# Patient Record
Sex: MALE | Race: BLACK OR AFRICAN AMERICAN | NOT HISPANIC OR LATINO | URBAN - METROPOLITAN AREA
[De-identification: names, ages, dates, MRNs, and addresses within clinical notes are randomized per-mention and may not be internally consistent; named-entity substitution may affect disease eponyms.]

---

## 2025-05-29 ENCOUNTER — OFFICE VISIT (OUTPATIENT)
Dept: URGENT CARE | Age: 55
End: 2025-05-29
Payer: COMMERCIAL

## 2025-05-29 VITALS
SYSTOLIC BLOOD PRESSURE: 149 MMHG | DIASTOLIC BLOOD PRESSURE: 79 MMHG | WEIGHT: 198 LBS | RESPIRATION RATE: 18 BRPM | HEART RATE: 69 BPM | OXYGEN SATURATION: 98 %

## 2025-05-29 DIAGNOSIS — M10.9 GOUTY ARTHRITIS OF RIGHT GREAT TOE: Primary | ICD-10-CM

## 2025-05-29 RX ORDER — INDOMETHACIN 50 MG/1
50 CAPSULE ORAL 3 TIMES DAILY
Qty: 15 CAPSULE | Refills: 0 | Status: SHIPPED | OUTPATIENT
Start: 2025-05-29 | End: 2025-06-03

## 2025-05-29 NOTE — PROGRESS NOTES
Subjective   Patient ID: Shayan Lopez is a 54 y.o. male. They present today with a chief complaint of Foot Pain (Day 1 - Right Foot Pain / swelling ).    History of Present Illness  HPI  This a 54-year-old -American male presents today complaining of pain over the right great toe onset this morning.  He denies any trauma.  States the area appeared to be swollen.  Past Medical History  Allergies as of 05/29/2025    (No Known Allergies)       Prescriptions Prior to Admission[1]     Medical History[2]    Surgical History[3]     reports that he has never smoked. He has never used smokeless tobacco. He reports that he does not currently use alcohol.    Review of Systems  Review of Systems   Musculoskeletal:  Positive for gait problem.   All other systems reviewed and are negative.                                 Objective    Vitals:    05/29/25 1310   BP: 149/79   Pulse: 69   Resp: 18   SpO2: 98%   Weight: 89.8 kg (198 lb)     No LMP for male patient.    Physical Exam  Patient is awake alert oriented x 3 no acute distress vital signs are stable.  He is afebrile.  Ambulates with a limp.    Examination of the right foot reveals some mild erythema and diffuse tenderness over the metatarsal phalangeal joint of the right great toe.  Pain is worse with flexion extension of the toe.  Bony alignment is intact.  No ankle pain.  There was some mild soft tissue swelling noted over the dorsal aspect of the foot.  Distal pulses were intact.  Procedures    Point of Care Test & Imaging Results from this visit  No results found for this visit on 05/29/25.   Imaging  No results found.    Cardiology, Vascular, and Other Imaging  No other imaging results found for the past 2 days      Diagnostic study results (if any) were reviewed by John Watkins DO.    Assessment/Plan   Allergies, medications, history, and pertinent labs/EKGs/Imaging reviewed by John Watkins DO.     Medical Decision Making      Orders and  Diagnoses  Diagnoses and all orders for this visit:  Gouty arthritis of right great toe  -     indomethacin (Indocin) 50 mg capsule; Take 1 capsule (50 mg) by mouth 3 times a day for 5 days.      Medical Admin Record      Patient disposition: Home    Electronically signed by John Watkins DO  1:24 PM           [1] (Not in a hospital admission)  [2] History reviewed. No pertinent past medical history.  [3] History reviewed. No pertinent surgical history.

## 2025-05-29 NOTE — LETTER
May 29, 2025     Patient: Shayan Lopez   YOB: 1970   Date of Visit: 5/29/2025       To Whom It May Concern:    Shayan Lopez was seen in my clinic on 5/29/2025 at 12:50 pm. Please excuse Shayan for his absence from work on this day to make the appointment. Lloyd may return to work 6/2/2025.     If you have any questions or concerns, please don't hesitate to call.         Sincerely,         John Watkins,         CC: No Recipients

## 2025-06-04 ENCOUNTER — OFFICE VISIT (OUTPATIENT)
Dept: URGENT CARE | Age: 55
End: 2025-06-04
Payer: COMMERCIAL

## 2025-06-04 VITALS
DIASTOLIC BLOOD PRESSURE: 77 MMHG | RESPIRATION RATE: 19 BRPM | SYSTOLIC BLOOD PRESSURE: 138 MMHG | HEIGHT: 67 IN | BODY MASS INDEX: 31.08 KG/M2 | WEIGHT: 198 LBS | HEART RATE: 64 BPM | OXYGEN SATURATION: 97 % | TEMPERATURE: 98.2 F

## 2025-06-04 DIAGNOSIS — M10.9 GOUTY ARTHRITIS OF RIGHT GREAT TOE: Primary | ICD-10-CM

## 2025-06-04 RX ORDER — PREDNISONE 20 MG/1
TABLET ORAL
Qty: 20 TABLET | Refills: 0 | Status: SHIPPED | OUTPATIENT
Start: 2025-06-04 | End: 2025-06-19

## 2025-06-04 RX ORDER — PREDNISONE 20 MG/1
TABLET ORAL
Qty: 20 TABLET | Refills: 0 | Status: SHIPPED | OUTPATIENT
Start: 2025-06-04 | End: 2025-06-04 | Stop reason: SDUPTHER

## 2025-06-04 ASSESSMENT — ENCOUNTER SYMPTOMS
RESPIRATORY NEGATIVE: 1
GASTROINTESTINAL NEGATIVE: 1
NEUROLOGICAL NEGATIVE: 1
HEMATOLOGIC/LYMPHATIC NEGATIVE: 1
PSYCHIATRIC NEGATIVE: 1
CONSTITUTIONAL NEGATIVE: 1
CARDIOVASCULAR NEGATIVE: 1
JOINT SWELLING: 1
ENDOCRINE NEGATIVE: 1
EYES NEGATIVE: 1
ALLERGIC/IMMUNOLOGIC NEGATIVE: 1

## 2025-06-04 NOTE — PROGRESS NOTES
"Subjective   Patient ID: Shayan Lopez is a 54 y.o. male. They present today with a chief complaint of Foot Pain (Rt big toe pain ).    History of Present Illness    History provided by:  Patient   used: No    Other  Location:  Right great toe pain for 2 weeks, was started on indomethacin, not effective  Severity:  Severe  Onset quality:  Gradual  Duration:  2 weeks  Timing:  Constant  Chronicity:  New      Past Medical History  Allergies as of 06/04/2025    (No Known Allergies)       Prescriptions Prior to Admission[1]     Medical History[2]    Surgical History[3]     reports that he has never smoked. He has never used smokeless tobacco. He reports that he does not currently use alcohol.    Review of Systems  Review of Systems   Constitutional: Negative.    HENT: Negative.     Eyes: Negative.    Respiratory: Negative.     Cardiovascular: Negative.    Gastrointestinal: Negative.    Endocrine: Negative.    Genitourinary: Negative.    Musculoskeletal:  Positive for joint swelling.   Skin: Negative.    Allergic/Immunologic: Negative.    Neurological: Negative.    Hematological: Negative.    Psychiatric/Behavioral: Negative.     All other systems reviewed and are negative.                                 Objective    Vitals:    06/04/25 1010   BP: 138/77   Pulse: 64   Resp: 19   Temp: 36.8 °C (98.2 °F)   TempSrc: Oral   SpO2: 97%   Weight: 89.8 kg (198 lb)   Height: 1.702 m (5' 7\")     No LMP for male patient.    Physical Exam  Vitals and nursing note reviewed.   Constitutional:       Appearance: Normal appearance. He is normal weight.   Cardiovascular:      Rate and Rhythm: Normal rate and regular rhythm.      Pulses: Normal pulses.      Heart sounds: Normal heart sounds.   Pulmonary:      Effort: Pulmonary effort is normal.      Breath sounds: Normal breath sounds.   Abdominal:      General: Bowel sounds are normal.      Palpations: Abdomen is soft.   Musculoskeletal:      Cervical back: Normal " range of motion.        Feet:    Feet:      Right foot:      Skin integrity: Erythema and warmth present.   Neurological:      General: No focal deficit present.      Mental Status: He is alert and oriented to person, place, and time. Mental status is at baseline.   Psychiatric:         Mood and Affect: Mood normal.         Behavior: Behavior normal.         Thought Content: Thought content normal.         Judgment: Judgment normal.         Procedures    Point of Care Test & Imaging Results from this visit  No results found for this visit on 06/04/25.   Imaging  No results found.    Cardiology, Vascular, and Other Imaging  No other imaging results found for the past 2 days      Diagnostic study results (if any) were reviewed by NELI Reveles.    Assessment/Plan   Allergies, medications, history, and pertinent labs/EKGs/Imaging reviewed by NELI Reveles.     Medical Decision Making  Medical Decision Making  At time of discharge patient was clinically well-appearing and HDS for outpatient management. The patient and/or family was educated regarding diagnosis, supportive care, OTC and Rx medications. The patient and/or family was given the opportunity to ask questions prior to discharge.  They verbalized understanding of my discussion of the plans for treatment, expected course, indications to return to  or seek further evaluation in ED, and the need for timely follow up as directed.   They were provided with a work/school excuse if requested.        Orders and Diagnoses  Diagnoses and all orders for this visit:  Gouty arthritis of right great toe  -     Referral to Primary Care; Future  -     predniSONE (Deltasone) 20 mg tablet; Take 1 tablet (20 mg) by mouth 3 times a day for 5 days, THEN 1 tablet (20 mg) 2 times a day for 5 days, THEN 1 tablet (20 mg) once daily for 5 days.  -     predniSONE (Deltasone) 20 mg tablet; Take 1 tablet (20 mg) by mouth 3 times a day for 5 days, THEN 1 tablet  (20 mg) 2 times a day for 5 days, THEN 1 tablet (20 mg) once daily for 5 days.      Return to Urgent care if symptoms return or progress  Follow up with PCP in 1-2 weeks     Patient disposition: Home    Electronically signed by NELI Reveles  10:35 AM           [1] (Not in a hospital admission)  [2] No past medical history on file.  [3] No past surgical history on file.

## 2025-06-15 ENCOUNTER — CLINICAL SUPPORT (OUTPATIENT)
Dept: EMERGENCY MEDICINE | Facility: HOSPITAL | Age: 55
End: 2025-06-15
Payer: COMMERCIAL

## 2025-06-15 ENCOUNTER — APPOINTMENT (OUTPATIENT)
Dept: RADIOLOGY | Facility: HOSPITAL | Age: 55
End: 2025-06-15
Payer: COMMERCIAL

## 2025-06-15 ENCOUNTER — HOSPITAL ENCOUNTER (EMERGENCY)
Facility: HOSPITAL | Age: 55
Discharge: HOME | End: 2025-06-15
Attending: EMERGENCY MEDICINE
Payer: COMMERCIAL

## 2025-06-15 VITALS
HEART RATE: 78 BPM | RESPIRATION RATE: 16 BRPM | DIASTOLIC BLOOD PRESSURE: 78 MMHG | OXYGEN SATURATION: 98 % | SYSTOLIC BLOOD PRESSURE: 138 MMHG | TEMPERATURE: 97.2 F

## 2025-06-15 DIAGNOSIS — R07.9 CHEST PAIN, UNSPECIFIED TYPE: Primary | ICD-10-CM

## 2025-06-15 DIAGNOSIS — R41.82 ALTERED MENTAL STATUS, UNSPECIFIED ALTERED MENTAL STATUS TYPE: ICD-10-CM

## 2025-06-15 LAB
ALBUMIN SERPL BCP-MCNC: 4.7 G/DL (ref 3.4–5)
ALP SERPL-CCNC: 56 U/L (ref 33–120)
ALT SERPL W P-5'-P-CCNC: 9 U/L (ref 10–52)
AMPHETAMINES UR QL SCN: NORMAL
ANION GAP BLDV CALCULATED.4IONS-SCNC: 12 MMOL/L (ref 10–25)
ANION GAP SERPL CALC-SCNC: 17 MMOL/L (ref 10–20)
APAP SERPL-MCNC: <10 UG/ML (ref ?–30)
APPEARANCE UR: CLEAR
AST SERPL W P-5'-P-CCNC: 15 U/L (ref 9–39)
ATRIAL RATE: 108 BPM
BARBITURATES UR QL SCN: NORMAL
BASE EXCESS BLDV CALC-SCNC: 1.7 MMOL/L (ref -2–3)
BASOPHILS # BLD AUTO: 0.03 X10*3/UL (ref 0–0.1)
BASOPHILS NFR BLD AUTO: 0.4 %
BENZODIAZ UR QL SCN: NORMAL
BILIRUB SERPL-MCNC: 0.4 MG/DL (ref 0–1.2)
BILIRUB UR STRIP.AUTO-MCNC: NEGATIVE MG/DL
BNP SERPL-MCNC: <2 PG/ML (ref 0–99)
BODY TEMPERATURE: 37 DEGREES CELSIUS
BUN SERPL-MCNC: 13 MG/DL (ref 6–23)
BZE UR QL SCN: NORMAL
CA-I BLDV-SCNC: 1.15 MMOL/L (ref 1.1–1.33)
CALCIUM SERPL-MCNC: 9.8 MG/DL (ref 8.6–10.6)
CANNABINOIDS UR QL SCN: NORMAL
CARDIAC TROPONIN I PNL SERPL HS: 3 NG/L (ref 0–53)
CARDIAC TROPONIN I PNL SERPL HS: 4 NG/L (ref 0–53)
CHLORIDE BLDV-SCNC: 104 MMOL/L (ref 98–107)
CHLORIDE SERPL-SCNC: 101 MMOL/L (ref 98–107)
CO2 SERPL-SCNC: 21 MMOL/L (ref 21–32)
COLOR UR: COLORLESS
CREAT SERPL-MCNC: 1.16 MG/DL (ref 0.5–1.3)
EGFRCR SERPLBLD CKD-EPI 2021: 74 ML/MIN/1.73M*2
EOSINOPHIL # BLD AUTO: 0.07 X10*3/UL (ref 0–0.7)
EOSINOPHIL NFR BLD AUTO: 0.9 %
ERYTHROCYTE [DISTWIDTH] IN BLOOD BY AUTOMATED COUNT: 13.7 % (ref 11.5–14.5)
ETHANOL SERPL-MCNC: 147 MG/DL
FENTANYL+NORFENTANYL UR QL SCN: NORMAL
GLUCOSE BLD MANUAL STRIP-MCNC: 95 MG/DL (ref 74–99)
GLUCOSE BLDV-MCNC: 96 MG/DL (ref 74–99)
GLUCOSE SERPL-MCNC: 78 MG/DL (ref 74–99)
GLUCOSE UR STRIP.AUTO-MCNC: NORMAL MG/DL
HCO3 BLDV-SCNC: 26.6 MMOL/L (ref 22–26)
HCT VFR BLD AUTO: 47 % (ref 41–52)
HCT VFR BLD EST: 44 % (ref 41–52)
HGB BLD-MCNC: 15.3 G/DL (ref 13.5–17.5)
HGB BLDV-MCNC: 14.6 G/DL (ref 13.5–17.5)
HOLD SPECIMEN: NORMAL
IMM GRANULOCYTES # BLD AUTO: 0.03 X10*3/UL (ref 0–0.7)
IMM GRANULOCYTES NFR BLD AUTO: 0.4 % (ref 0–0.9)
KETONES UR STRIP.AUTO-MCNC: NEGATIVE MG/DL
LACTATE BLDV-SCNC: 2.1 MMOL/L (ref 0.4–2)
LACTATE BLDV-SCNC: 2.6 MMOL/L (ref 0.4–2)
LEUKOCYTE ESTERASE UR QL STRIP.AUTO: NEGATIVE
LIPASE SERPL-CCNC: 110 U/L (ref 9–82)
LYMPHOCYTES # BLD AUTO: 3.45 X10*3/UL (ref 1.2–4.8)
LYMPHOCYTES NFR BLD AUTO: 42.8 %
MCH RBC QN AUTO: 26.8 PG (ref 26–34)
MCHC RBC AUTO-ENTMCNC: 32.6 G/DL (ref 32–36)
MCV RBC AUTO: 83 FL (ref 80–100)
METHADONE UR QL SCN: NORMAL
MONOCYTES # BLD AUTO: 0.5 X10*3/UL (ref 0.1–1)
MONOCYTES NFR BLD AUTO: 6.2 %
NEUTROPHILS # BLD AUTO: 3.99 X10*3/UL (ref 1.2–7.7)
NEUTROPHILS NFR BLD AUTO: 49.3 %
NITRITE UR QL STRIP.AUTO: NEGATIVE
NRBC BLD-RTO: 0 /100 WBCS (ref 0–0)
OPIATES UR QL SCN: NORMAL
OXYCODONE+OXYMORPHONE UR QL SCN: NORMAL
OXYHGB MFR BLDV: 69.9 % (ref 45–75)
P AXIS: 62 DEGREES
P OFFSET: 215 MS
P ONSET: 161 MS
PCO2 BLDV: 42 MM HG (ref 41–51)
PCP UR QL SCN: NORMAL
PH BLDV: 7.41 PH (ref 7.33–7.43)
PH UR STRIP.AUTO: 6 [PH]
PLATELET # BLD AUTO: 311 X10*3/UL (ref 150–450)
PO2 BLDV: 45 MM HG (ref 35–45)
POTASSIUM BLDV-SCNC: 5 MMOL/L (ref 3.5–5.3)
POTASSIUM SERPL-SCNC: 3.9 MMOL/L (ref 3.5–5.3)
PR INTERVAL: 136 MS
PROT SERPL-MCNC: 8.6 G/DL (ref 6.4–8.2)
PROT UR STRIP.AUTO-MCNC: NEGATIVE MG/DL
Q ONSET: 229 MS
QRS COUNT: 18 BEATS
QRS DURATION: 78 MS
QT INTERVAL: 352 MS
QTC CALCULATION(BAZETT): 471 MS
QTC FREDERICIA: 428 MS
R AXIS: -19 DEGREES
RBC # BLD AUTO: 5.7 X10*6/UL (ref 4.5–5.9)
RBC # UR STRIP.AUTO: NEGATIVE MG/DL
SALICYLATES SERPL-MCNC: <3 MG/DL (ref ?–20)
SAO2 % BLDV: 71 % (ref 45–75)
SODIUM BLDV-SCNC: 138 MMOL/L (ref 136–145)
SODIUM SERPL-SCNC: 135 MMOL/L (ref 136–145)
SP GR UR STRIP.AUTO: 1.01
T AXIS: 37 DEGREES
T OFFSET: 405 MS
UROBILINOGEN UR STRIP.AUTO-MCNC: NORMAL MG/DL
VENTRICULAR RATE: 108 BPM
WBC # BLD AUTO: 8.1 X10*3/UL (ref 4.4–11.3)

## 2025-06-15 PROCEDURE — 93010 ELECTROCARDIOGRAM REPORT: CPT | Performed by: EMERGENCY MEDICINE

## 2025-06-15 PROCEDURE — 36415 COLL VENOUS BLD VENIPUNCTURE: CPT | Performed by: EMERGENCY MEDICINE

## 2025-06-15 PROCEDURE — 84484 ASSAY OF TROPONIN QUANT: CPT | Performed by: EMERGENCY MEDICINE

## 2025-06-15 PROCEDURE — 99285 EMERGENCY DEPT VISIT HI MDM: CPT | Mod: 25 | Performed by: EMERGENCY MEDICINE

## 2025-06-15 PROCEDURE — 82947 ASSAY GLUCOSE BLOOD QUANT: CPT

## 2025-06-15 PROCEDURE — 36415 COLL VENOUS BLD VENIPUNCTURE: CPT

## 2025-06-15 PROCEDURE — 71275 CT ANGIOGRAPHY CHEST: CPT

## 2025-06-15 PROCEDURE — 83690 ASSAY OF LIPASE: CPT | Performed by: EMERGENCY MEDICINE

## 2025-06-15 PROCEDURE — 93005 ELECTROCARDIOGRAM TRACING: CPT

## 2025-06-15 PROCEDURE — 71045 X-RAY EXAM CHEST 1 VIEW: CPT

## 2025-06-15 PROCEDURE — 83880 ASSAY OF NATRIURETIC PEPTIDE: CPT | Performed by: EMERGENCY MEDICINE

## 2025-06-15 PROCEDURE — 84075 ASSAY ALKALINE PHOSPHATASE: CPT | Performed by: EMERGENCY MEDICINE

## 2025-06-15 PROCEDURE — 80320 DRUG SCREEN QUANTALCOHOLS: CPT | Performed by: EMERGENCY MEDICINE

## 2025-06-15 PROCEDURE — 85025 COMPLETE CBC W/AUTO DIFF WBC: CPT | Performed by: EMERGENCY MEDICINE

## 2025-06-15 PROCEDURE — 2550000001 HC RX 255 CONTRASTS: Performed by: EMERGENCY MEDICINE

## 2025-06-15 PROCEDURE — 81003 URINALYSIS AUTO W/O SCOPE: CPT | Performed by: EMERGENCY MEDICINE

## 2025-06-15 PROCEDURE — 71275 CT ANGIOGRAPHY CHEST: CPT | Performed by: RADIOLOGY

## 2025-06-15 PROCEDURE — 99291 CRITICAL CARE FIRST HOUR: CPT | Performed by: EMERGENCY MEDICINE

## 2025-06-15 PROCEDURE — 74174 CTA ABD&PLVS W/CONTRAST: CPT | Performed by: RADIOLOGY

## 2025-06-15 PROCEDURE — 82805 BLOOD GASES W/O2 SATURATION: CPT

## 2025-06-15 PROCEDURE — 70450 CT HEAD/BRAIN W/O DYE: CPT | Performed by: RADIOLOGY

## 2025-06-15 PROCEDURE — 80053 COMPREHEN METABOLIC PANEL: CPT

## 2025-06-15 PROCEDURE — 80307 DRUG TEST PRSMV CHEM ANLYZR: CPT | Performed by: EMERGENCY MEDICINE

## 2025-06-15 PROCEDURE — 70450 CT HEAD/BRAIN W/O DYE: CPT

## 2025-06-15 PROCEDURE — 71045 X-RAY EXAM CHEST 1 VIEW: CPT | Performed by: RADIOLOGY

## 2025-06-15 PROCEDURE — 2500000001 HC RX 250 WO HCPCS SELF ADMINISTERED DRUGS (ALT 637 FOR MEDICARE OP)

## 2025-06-15 PROCEDURE — 83605 ASSAY OF LACTIC ACID: CPT

## 2025-06-15 RX ORDER — NALOXONE HYDROCHLORIDE 1 MG/ML
INJECTION INTRAMUSCULAR; INTRAVENOUS; SUBCUTANEOUS
Status: DISCONTINUED
Start: 2025-06-15 | End: 2025-06-15 | Stop reason: HOSPADM

## 2025-06-15 RX ORDER — NALOXONE HYDROCHLORIDE 1 MG/ML
2 INJECTION INTRAMUSCULAR; INTRAVENOUS; SUBCUTANEOUS ONCE
Status: DISCONTINUED | OUTPATIENT
Start: 2025-06-15 | End: 2025-06-15 | Stop reason: HOSPADM

## 2025-06-15 RX ORDER — NALOXONE HYDROCHLORIDE 1 MG/ML
4 INJECTION INTRAMUSCULAR; INTRAVENOUS; SUBCUTANEOUS ONCE
Status: DISCONTINUED | OUTPATIENT
Start: 2025-06-15 | End: 2025-06-15 | Stop reason: HOSPADM

## 2025-06-15 RX ORDER — NALOXONE HYDROCHLORIDE 0.4 MG/ML
0.2 INJECTION, SOLUTION INTRAMUSCULAR; INTRAVENOUS; SUBCUTANEOUS EVERY 5 MIN PRN
Status: CANCELLED | OUTPATIENT
Start: 2025-06-15

## 2025-06-15 RX ORDER — ACETAMINOPHEN 325 MG/1
975 TABLET ORAL ONCE
Status: COMPLETED | OUTPATIENT
Start: 2025-06-15 | End: 2025-06-15

## 2025-06-15 RX ADMIN — ACETAMINOPHEN 975 MG: 325 TABLET ORAL at 13:35

## 2025-06-15 RX ADMIN — IOHEXOL 90 ML: 350 INJECTION, SOLUTION INTRAVENOUS at 05:09

## 2025-06-15 ASSESSMENT — LIFESTYLE VARIABLES
HAVE PEOPLE ANNOYED YOU BY CRITICIZING YOUR DRINKING: NO
HAVE YOU EVER FELT YOU SHOULD CUT DOWN ON YOUR DRINKING: NO
EVER FELT BAD OR GUILTY ABOUT YOUR DRINKING: NO
TOTAL SCORE: 0
EVER HAD A DRINK FIRST THING IN THE MORNING TO STEADY YOUR NERVES TO GET RID OF A HANGOVER: NO

## 2025-06-15 ASSESSMENT — PAIN SCALES - GENERAL
PAINLEVEL_OUTOF10: 3
PAINLEVEL_OUTOF10: 3

## 2025-06-15 ASSESSMENT — PAIN - FUNCTIONAL ASSESSMENT: PAIN_FUNCTIONAL_ASSESSMENT: 0-10

## 2025-06-15 NOTE — ED NOTES
Pt moved from room 3 to room 27, No report given from Trauma RN. Per Provider, Pt had near syncopal episode in triage, Critical activated and Pt taken to Room 3, 8mg Narcan given, Per provider, Pt appears intoxicated, will reassess when sober. Pt VSS, Resp E&U at this time.      Stephen Weston RN  06/15/25 0975

## 2025-06-15 NOTE — ED PROVIDER NOTES
Note created in error. Author (Enrico Mccann, MS4) signed up for this patient prior to completion of triage. Found to be a critical patient. Pavel Pickett and Angie to assume care for this patient.      Enrico Mccann  06/15/25 0436

## 2025-06-15 NOTE — DISCHARGE INSTRUCTIONS
May take Tylenol or ibuprofen if able for continued discomfort.  Please follow-up with your primary doctor as well as cardiology.  I have ordered a referral to cardiology.  I have also provided their contact information here.  Return to the emergency department for new or worsening symptoms or for any other complaints.    --   [facility]  Phone: [phone]  Address: [place] [address]    --   Cardiology Clinic  [facility] [place]  Phone: [phone]

## 2025-06-15 NOTE — PROGRESS NOTES
Emergency Department Transition of Care Note       Signout   I received Shayan Lopez in signout from Dr. Pickett.  Please see the previous note for all HPI, PE and MDM up to the time of signout at 0700.    In brief Shayan Lopez is an 54 y.o. male presenting for   Chief Complaint   Patient presents with    Chest Pain           ED Course & Medical Decision Making   At the time of signout, the patient's disposition is pending sober reevaluation.  This is a 54-year-old male who presented to the emergency department initially with chest pain.  Apparently had a near syncope or syncopal event in triage and was activated as a critical patient.  Had a complete chest pain workup that was largely unremarkable.  Opponent is normal.  No EKG changes.  CT PE was obtained that was negative.  CT head was without intracranial pathology.  Alcohol was elevated.  Please see the ED course below.  Patient reevaluated.  Improved.  No more chest pain but now complaining of a headache.  Given Tylenol.  He is appropriate for discharge home.  He is recommended to follow-up with his primary doctor as well as cardiologist.  He is ambulatory, tolerating p.o.  Family is at bedside.  Patient and family agreeable with plan.  Discharged in good condition.    ED Course:  ED Course as of 06/15/25 1332   Sun Yusef 15, 2025   0943 XR chest 1 view  No acute cardiopulmonary pathology. [VM]   1320 Patient reevaluated, back to his baseline.  Asking to be discharged home to sleep in his bed.  Family concerned about unknown cause of what happened.  Reassured that his workup is negative today.  Recommended close outpatient follow-up.  Patient and family are agreeable.  Patient remained stable, ambulated in the emergency department was tolerating p.o.  Appropriate for discharge home at this time.  Will follow-up with your primary doctor and recommended blood pressure evaluation.  Patient expressed understanding agree with the plan.  Discharged in good condition. [VM]       ED Course User Index  [VM] Vern Gonzáles DO         Diagnoses as of 06/15/25 1332   Chest pain, unspecified type       Patient seen by and discussed with the attending emergency medicine physician.     Disposition   As a result of the work-up, the patient was discharged home.  he was informed of his diagnosis and instructed to come back with any concerns or worsening of condition.  he and was agreeable to the plan as discussed above.  he was given the opportunity to ask questions.  All of the patient's questions were answered.    Procedures   Procedures    Patient seen and discussed with ED attending physician.    Vern Gonzáles DO  Emergency Medicine PGY-3  [facility]

## 2025-06-16 LAB
HOLD SPECIMEN: NORMAL

## 2025-06-17 NOTE — ED PROVIDER NOTES
HPI   Chief Complaint   Patient presents with    Chest Pain       Patient is a 54-year-old with minimal charted past medical history presenting as a critical activation after patient became unresponsive in triage.  Difficult to initially obtain history from patient.  Patient did endorse drinking several Rene Cabrera earlier today.  Collateral from girlfriend is that he was endorsing shortness of breath while they were together earlier today.   No known COPD or asthma history.  Per girlfriend no symptoms prior to today.  No known cardiac history or similar events in the past.              Patient History   Medical History[1]  Surgical History[2]  Family History[3]  Social History[4]    Physical Exam   ED Triage Vitals   Temperature Heart Rate Respirations BP   06/15/25 0408 06/15/25 0408 06/15/25 0408 06/15/25 0408   35.8 °C (96.4 °F) (!) 111 18 (!) 191/101      Pulse Ox Temp Source Heart Rate Source Patient Position   06/15/25 0408 06/15/25 0408 06/15/25 0408 06/15/25 1254   99 % Temporal Monitor Lying      BP Location FiO2 (%)     06/15/25 1254 06/15/25 0500     Right arm 44 %       Physical Exam  Constitutional:       Appearance: Normal appearance.      Comments: Arouses to sternal rub.   HENT:      Head: Normocephalic and atraumatic.   Eyes:      Comments: Pupils 1.5 mm, minimally reactive bilaterally   Cardiovascular:      Rate and Rhythm: Normal rate.   Pulmonary:      Comments: Episodes of apnea but when spontaneously breathing has lungs clear to auscultation, no appreciable wheeze.  Satting well on nasal cannula during episodes of spontaneous respirations but around episodes of apnea and required escalation to nonrebreather or BiPAP.  Musculoskeletal:         General: Normal range of motion.      Cervical back: Normal range of motion.   Skin:     General: Skin is warm and dry.   Neurological:      Comments: Arouses minimally to sternal rub.  Does intermittently arouse enough to answer questions logically but  then goes back to somnolent state.  Does move all 4 extremities during episodes of arousal.  No clear focal sensory deficits as responding to pain in all 4 extremities.   Psychiatric:         Mood and Affect: Mood normal.         Behavior: Behavior normal.           ED Course & MDM   ED Course as of 06/18/25 0926   Sun Yusef 15, 2025   0943 XR chest 1 view  No acute cardiopulmonary pathology. [VM]   1320 Patient reevaluated, back to his baseline.  Asking to be discharged home to sleep in his bed.  Family concerned about unknown cause of what happened.  Reassured that his workup is negative today.  Recommended close outpatient follow-up.  Patient and family are agreeable.  Patient remained stable, ambulated in the emergency department was tolerating p.o.  Appropriate for discharge home at this time.  Will follow-up with your primary doctor and recommended blood pressure evaluation.  Patient expressed understanding agree with the plan.  Discharged in good condition. [VM]      ED Course User Index  [VM] Deepaelyse Eliecer, DO         Diagnoses as of 06/18/25 0926   Chest pain, unspecified type   Altered mental status, unspecified altered mental status type                 No data recorded     [place] Coma Scale Score: 15 (06/15/25 1330 : Stephen Weston RN)                           Medical Decision Making  EKG shows borderline tachycardia to 108 bpm, normal sinus rhythm, normal axis,  ms, QRS 78 ms, QTc 471 ms. Normal ST and T wave pattern with no evidence of acute ischemia or other acute findings.    Patient is a 54-year-old with minimal charted past medical history presenting as a critical activation after patient became unresponsive in triage.  No immediately clear etiology.  Blood glucose within normal limits.  No focality to symptoms.  Does arouse long enough to endorse alcohol use and per collateral history concern for smoking something earlier today as well.  Does have periods of apnea concerning for opioid or  related toxidrome.  Otherwise when not having apnea is satting appropriately.  Appropriate circulation.  Lungs clear to auscultation.  Given ambiguity, cardiopulmonary workup pursued.  CT head and CTA of chest/abdomen/pelvis without clear pathology.  EKG benign as above.  Blood work without other clear etiology.  Was treated empirically with Narcan.  Did have improved responsiveness after Narcan but not immediate and unclear how much of improvement was related to effect of Narcan versus time. Patient did have profound enough apneic events that intubation was considered but gradually improved over time.  Handed off pending monitoring for clinical improvement with current diagnosis of exclusion being intoxication.    Patient seen and discussed with Dr. Angie Pickett MD, PhD  Emergency Medicine PGY3          Procedure  Procedures       Yuriy Pickett MD  Resident  06/17/25 1434    Emergency Medicine Attending Attestation:     ED Course as of 06/18/25 0926   Sun Yusef 15, 2025   0943 XR chest 1 view  No acute cardiopulmonary pathology. [VM]   1320 Patient reevaluated, back to his baseline.  Asking to be discharged home to sleep in his bed.  Family concerned about unknown cause of what happened.  Reassured that his workup is negative today.  Recommended close outpatient follow-up.  Patient and family are agreeable.  Patient remained stable, ambulated in the emergency department was tolerating p.o.  Appropriate for discharge home at this time.  Will follow-up with your primary doctor and recommended blood pressure evaluation.  Patient expressed understanding agree with the plan.  Discharged in good condition. [VM]      ED Course User Index  [VM] Vern Gonzáles DO         Diagnoses as of 06/18/25 0926   Chest pain, unspecified type   Altered mental status, unspecified altered mental status type       The patient was seen by the resident/fellow.  I have personally performed a substantive portion of the encounter.   I have seen and examined the patient; agree with the workup, evaluation, MDM, management and diagnosis.  The care plan has been discussed with the resident; I have reviewed the resident’s note and agree with the documented findings.                         Desean Adams MD           [1] No past medical history on file.  [2] No past surgical history on file.  [3] No family history on file.  [4]   Social History  Tobacco Use    Smoking status: Never    Smokeless tobacco: Never   Substance Use Topics    Alcohol use: Not Currently    Drug use: Not on file        Desean Adams MD  06/18/25 0968

## 2025-06-18 NOTE — ED PROCEDURE NOTE
Procedure  Critical Care    Performed by: Desean Adams MD  Authorized by: Desean Adams MD    Critical care provider statement:     Critical care time (minutes):  32    Critical care time was exclusive of:  Separately billable procedures and treating other patients and teaching time    Critical care was necessary to treat or prevent imminent or life-threatening deterioration of the following conditions: waxing and waning mental status with need to monitor respiratory function.    Critical care was time spent personally by me on the following activities:  Blood draw for specimens, development of treatment plan with patient or surrogate, evaluation of patient's response to treatment, examination of patient, obtaining history from patient or surrogate, review of old charts, re-evaluation of patient's condition, pulse oximetry, ordering and review of radiographic studies, ordering and review of laboratory studies and ordering and performing treatments and interventions             Desean Adams MD  06/18/25 0941

## 2025-07-11 PROBLEM — M62.82 RHABDOMYOLYSIS: Status: ACTIVE | Noted: 2019-09-17

## 2025-07-11 PROBLEM — N20.0 KIDNEY STONES: Status: ACTIVE | Noted: 2019-09-17

## 2025-07-14 ENCOUNTER — OFFICE VISIT (OUTPATIENT)
Dept: CARDIOLOGY | Facility: CLINIC | Age: 55
End: 2025-07-14
Payer: COMMERCIAL

## 2025-07-14 VITALS
SYSTOLIC BLOOD PRESSURE: 127 MMHG | WEIGHT: 201.6 LBS | HEIGHT: 66 IN | BODY MASS INDEX: 32.4 KG/M2 | DIASTOLIC BLOOD PRESSURE: 81 MMHG | OXYGEN SATURATION: 95 % | HEART RATE: 80 BPM

## 2025-07-14 DIAGNOSIS — R07.9 CHEST PAIN, UNSPECIFIED TYPE: Primary | ICD-10-CM

## 2025-07-14 DIAGNOSIS — R06.09 DOE (DYSPNEA ON EXERTION): ICD-10-CM

## 2025-07-14 PROCEDURE — 99204 OFFICE O/P NEW MOD 45 MIN: CPT | Performed by: INTERNAL MEDICINE

## 2025-07-14 PROCEDURE — 93005 ELECTROCARDIOGRAM TRACING: CPT | Performed by: INTERNAL MEDICINE

## 2025-07-14 PROCEDURE — 3008F BODY MASS INDEX DOCD: CPT | Performed by: INTERNAL MEDICINE

## 2025-07-14 PROCEDURE — 1036F TOBACCO NON-USER: CPT | Performed by: INTERNAL MEDICINE

## 2025-07-14 PROCEDURE — 99212 OFFICE O/P EST SF 10 MIN: CPT

## 2025-07-14 RX ORDER — ALBUTEROL SULFATE 0.83 MG/ML
3 SOLUTION RESPIRATORY (INHALATION) ONCE
OUTPATIENT
Start: 2025-07-14 | End: 2025-07-14

## 2025-07-14 RX ORDER — ALBUTEROL SULFATE 90 UG/1
1 INHALANT RESPIRATORY (INHALATION) ONCE
OUTPATIENT
Start: 2025-07-14

## 2025-07-14 ASSESSMENT — ENCOUNTER SYMPTOMS
BLOATING: 0
HEMOPTYSIS: 0
WHEEZING: 0
DEPRESSION: 0
DIARRHEA: 0
DYSURIA: 0
HEMATURIA: 0
ALTERED MENTAL STATUS: 0
MEMORY LOSS: 0
LOSS OF SENSATION IN FEET: 0
CHILLS: 0
FEVER: 0
COUGH: 0
ABDOMINAL PAIN: 0
MYALGIAS: 0
VOMITING: 0
CONSTIPATION: 0
HEADACHES: 0
FALLS: 0
NAUSEA: 0
OCCASIONAL FEELINGS OF UNSTEADINESS: 0

## 2025-07-14 ASSESSMENT — PAIN SCALES - GENERAL: PAINLEVEL_OUTOF10: 0-NO PAIN

## 2025-07-14 ASSESSMENT — COLUMBIA-SUICIDE SEVERITY RATING SCALE - C-SSRS
1. IN THE PAST MONTH, HAVE YOU WISHED YOU WERE DEAD OR WISHED YOU COULD GO TO SLEEP AND NOT WAKE UP?: NO
6. HAVE YOU EVER DONE ANYTHING, STARTED TO DO ANYTHING, OR PREPARED TO DO ANYTHING TO END YOUR LIFE?: NO
2. HAVE YOU ACTUALLY HAD ANY THOUGHTS OF KILLING YOURSELF?: NO

## 2025-07-14 ASSESSMENT — PATIENT HEALTH QUESTIONNAIRE - PHQ9
2. FEELING DOWN, DEPRESSED OR HOPELESS: NOT AT ALL
SUM OF ALL RESPONSES TO PHQ9 QUESTIONS 1 AND 2: 0
1. LITTLE INTEREST OR PLEASURE IN DOING THINGS: NOT AT ALL

## 2025-07-14 NOTE — PROGRESS NOTES
Chief Complaint   Patient presents with    New Patient Visit    Chest Pain       Referring Provider Information:  ROSEANNE SILVERIO     HPI  53 yo BM w/ no sig PMH now here for cardiology consult. He has a long h/o occ sharp CP at rest x ~15 minutes, no radiation, +assoc dyspnea, no assoc N/V/diaph.  +occ dyspnea at rest. +ch ORTEZ (mod exertion). No orthopnea/PND. No palps. +occ mild LH. No syncope. No edema. No claudication. No cough.   ECG 6/25: ST (108), normal  ECG 7/25: SR (80), normal  CXR 6/25: no acute abnl  CTA chest 6/25: no PE, nl Ao, no CAC, nl heart size, no peric eff  CTA ab 6/25: mild-mod AA, no AAA, celiac art stenosis    Review of Systems   Constitutional: Negative for chills, fever and malaise/fatigue.   HENT:  Negative for hearing loss.    Eyes:  Negative for visual disturbance.   Respiratory:  Negative for cough, hemoptysis and wheezing.    Skin:  Negative for rash.   Musculoskeletal:  Negative for falls and myalgias.   Gastrointestinal:  Negative for bloating, abdominal pain, constipation, diarrhea, dysphagia, nausea and vomiting.   Genitourinary:  Negative for dysuria and hematuria.   Neurological:  Negative for headaches.   Psychiatric/Behavioral:  Negative for altered mental status, depression and memory loss.       Social History     Tobacco Use    Smoking status: Never    Smokeless tobacco: Never   Substance Use Topics    Alcohol use: Not Currently      No pertinent FHX    RX Allergies[1]     No current outpatient medications     Vitals:    07/14/25 1039   BP: 127/81   Pulse: 80   SpO2: 95%      Physical Exam  Constitutional:       Appearance: Normal appearance.   HENT:      Head: Normocephalic and atraumatic.      Nose: Nose normal.   Neck:      Vascular: No carotid bruit.   Cardiovascular:      Rate and Rhythm: Normal rate and regular rhythm.      Heart sounds: No murmur heard.  Pulmonary:      Effort: Pulmonary effort is normal.      Breath sounds: Normal breath sounds.   Abdominal:       Palpations: Abdomen is soft.      Tenderness: There is no abdominal tenderness.   Musculoskeletal:      Right lower leg: No edema.      Left lower leg: No edema.   Skin:     General: Skin is warm and dry.   Neurological:      General: No focal deficit present.      Mental Status: He is alert.   Psychiatric:         Mood and Affect: Mood normal.         Judgment: Judgment normal.        Labs  5/25 Cr 1.16, K 3.9, HGB 15.3, , TPN neg, BNP <2    Assessment/Plan   55 yo BM w/ no sig PMH now w/ CP/ORTEZ of unclear etiology. ECG today normal. TPN neg. CTA chest 6/25 with no CAC, nl heart size, no peric eff along w/ no PE/nl Ao. CT ab did report mild-mod AA and celiac art stenosis. Check ETT, PFT and CT cardiac score (since CTA chest was with contrast making it harder to detect CAC and CT ab did report athero). TRACEY Murrell MD       [1] No Known Allergies

## 2025-07-15 ENCOUNTER — APPOINTMENT (OUTPATIENT)
Dept: PRIMARY CARE | Facility: CLINIC | Age: 55
End: 2025-07-15
Payer: COMMERCIAL

## 2025-07-15 VITALS
WEIGHT: 204 LBS | SYSTOLIC BLOOD PRESSURE: 134 MMHG | BODY MASS INDEX: 32.78 KG/M2 | HEART RATE: 60 BPM | HEIGHT: 66 IN | DIASTOLIC BLOOD PRESSURE: 80 MMHG

## 2025-07-15 DIAGNOSIS — M25.561 CHRONIC PAIN OF RIGHT KNEE: ICD-10-CM

## 2025-07-15 DIAGNOSIS — G89.29 CHRONIC PAIN OF RIGHT KNEE: ICD-10-CM

## 2025-07-15 DIAGNOSIS — M10.9 GOUTY ARTHRITIS OF RIGHT GREAT TOE: ICD-10-CM

## 2025-07-15 DIAGNOSIS — Z12.5 PROSTATE CANCER SCREENING: ICD-10-CM

## 2025-07-15 DIAGNOSIS — M10.071 ACUTE IDIOPATHIC GOUT OF RIGHT FOOT: ICD-10-CM

## 2025-07-15 DIAGNOSIS — Z13.29 SCREENING FOR THYROID DISORDER: ICD-10-CM

## 2025-07-15 DIAGNOSIS — Z00.00 ROUTINE GENERAL MEDICAL EXAMINATION AT A HEALTH CARE FACILITY: Primary | ICD-10-CM

## 2025-07-15 DIAGNOSIS — Z12.11 COLON CANCER SCREENING: ICD-10-CM

## 2025-07-15 DIAGNOSIS — Z13.220 SCREENING FOR LIPID DISORDERS: ICD-10-CM

## 2025-07-15 DIAGNOSIS — Z13.1 SCREENING FOR DIABETES MELLITUS: ICD-10-CM

## 2025-07-15 PROCEDURE — 99386 PREV VISIT NEW AGE 40-64: CPT | Performed by: FAMILY MEDICINE

## 2025-07-15 PROCEDURE — 3008F BODY MASS INDEX DOCD: CPT | Performed by: FAMILY MEDICINE

## 2025-07-15 ASSESSMENT — ENCOUNTER SYMPTOMS
OCCASIONAL FEELINGS OF UNSTEADINESS: 0
LOSS OF SENSATION IN FEET: 0
DEPRESSION: 0

## 2025-07-15 ASSESSMENT — PATIENT HEALTH QUESTIONNAIRE - PHQ9
1. LITTLE INTEREST OR PLEASURE IN DOING THINGS: NOT AT ALL
SUM OF ALL RESPONSES TO PHQ9 QUESTIONS 1 AND 2: 0
2. FEELING DOWN, DEPRESSED OR HOPELESS: NOT AT ALL

## 2025-07-17 ENCOUNTER — TELEPHONE (OUTPATIENT)
Dept: SCHEDULING | Age: 55
End: 2025-07-17
Payer: COMMERCIAL

## 2025-07-18 LAB
ATRIAL RATE: 80 BPM
P AXIS: 58 DEGREES
P OFFSET: 210 MS
P ONSET: 152 MS
PR INTERVAL: 150 MS
Q ONSET: 227 MS
QRS COUNT: 13 BEATS
QRS DURATION: 84 MS
QT INTERVAL: 378 MS
QTC CALCULATION(BAZETT): 435 MS
QTC FREDERICIA: 416 MS
R AXIS: 4 DEGREES
T AXIS: 22 DEGREES
T OFFSET: 416 MS
VENTRICULAR RATE: 80 BPM

## 2025-07-19 PROBLEM — G89.29 CHRONIC PAIN OF RIGHT KNEE: Status: ACTIVE | Noted: 2025-07-19

## 2025-07-19 PROBLEM — M10.071 ACUTE IDIOPATHIC GOUT OF RIGHT FOOT: Status: ACTIVE | Noted: 2025-07-19

## 2025-07-19 PROBLEM — Z00.00 ROUTINE GENERAL MEDICAL EXAMINATION AT A HEALTH CARE FACILITY: Status: ACTIVE | Noted: 2025-07-19

## 2025-07-19 PROBLEM — Z12.11 COLON CANCER SCREENING: Status: ACTIVE | Noted: 2025-07-19

## 2025-07-19 PROBLEM — Z12.5 PROSTATE CANCER SCREENING: Status: ACTIVE | Noted: 2025-07-19

## 2025-07-19 PROBLEM — Z13.29 SCREENING FOR THYROID DISORDER: Status: ACTIVE | Noted: 2025-07-19

## 2025-07-19 PROBLEM — Z13.220 SCREENING FOR LIPID DISORDERS: Status: ACTIVE | Noted: 2025-07-19

## 2025-07-19 PROBLEM — Z13.1 SCREENING FOR DIABETES MELLITUS: Status: ACTIVE | Noted: 2025-07-19

## 2025-07-19 PROBLEM — M10.9 GOUTY ARTHRITIS OF RIGHT GREAT TOE: Status: ACTIVE | Noted: 2025-07-19

## 2025-07-19 PROBLEM — M25.561 CHRONIC PAIN OF RIGHT KNEE: Status: ACTIVE | Noted: 2025-07-19

## 2025-07-19 NOTE — PROGRESS NOTES
"Subjective   Patient ID: Shayan Lopez is a 54 y.o. male who presents for New Patient Visit (No concerns to address//), Annual Exam, and Knee Pain.    Last Physical : ____ Years ago     Pt's PMH, PSH, SH, FH , meds and allergies was obtained / reviewed and updated .     Dental  Visits : Y  Vision issues : N  Hearing issues : N    Immunizations : Y    Diet :  Could be better   Exercise:    Weight concerns :    Alcohol: as noted in   Tobacco: as noted in   Recreational drugs :  None /as noted in      Metabolic screening   - Lipids   - Glucose   Patient presented to the ER with chest pain has already seen cardiology was also short of breath was referred to pulmonology for pulmonary function test he has not had a colonoscopy  Has had pain in the right knee denies any injuries  ==================================    Visit Vitals  /80   Pulse 60   Ht 1.676 m (5' 6\")   Wt 92.5 kg (204 lb)   BMI 32.93 kg/m²   Smoking Status Never   BSA 2.08 m²      =====================  Review of Systems:    Constitutional: no chills, no fever and no night sweats.     Eyes: no blurred vision and no eyesight problems.     ENT: no hearing loss, no nasal congestion, no nasal discharge, no hoarseness and no sore throat.     Cardiovascular: no chest pain, no intermittent leg claudication, no lower extremity edema, no palpitations and no syncope.     Respiratory: no cough, no shortness of breath     Gastrointestinal: no abdominal pain,  no nausea, no rectal pain and no vomiting.     Genitourinary: no dysuria, no change in urinary frequency,.     Musculoskeletal: no arthralgias, and no myalgias.     Integumentary: no new skin lesions and no rashes.     Neurological: no difficulty walking, no headache,     Psychiatric: no anxiety, no depression, no anhedonia and no substance use disorders.            All other systems have been reviewed and are negative for complaint.    =====================================================    Physical " exam :    Constitutional: Alert and in no acute distress. Well developed, well nourished.     Eyes: Normal external exam. Pupils were equal in size, round, reactive to light (PERRL) with normal accommodation and extraocular movements intact (EOMI).     Ears, Nose, Mouth, and Throat: External inspection of ears and nose: Normal.  Otoscopic examination: Normal.      Neck: No neck mass was observed. Supple.     Cardiovascular: Heart rate and rhythm were normal, normal S1 and S2, no gallops, no murmurs and no pericardial rub    Pulmonary: No respiratory distress. Clear bilateral breath sounds.     Abdomen: Soft nontender; no abdominal mass palpated. No organomegaly.     Musculoskeletal: No joint swelling seen, normal movements of all extremities. Range of motion: Normal.  Muscle strength/tone: Normal.      Skin: Normal skin color and pigmentation, normal skin turgor, and no rash.     Neurologic: Deep tendon reflexes were 2+ and symmetric. Sensation: Normal.     Psychiatric: Judgment and insight: Intact. Mood and affect: Normal.    Lymphatic : Cervical/ axillary/ groin Lns Palpable/ non palpable       Assessment/Plan    Problem List Items Addressed This Visit       Routine general medical examination at a health care facility - Primary    Relevant Orders    TSH    Hemoglobin A1c    Vitamin B12    Vitamin D 25-Hydroxy,Total (for eval of Vitamin D levels)    Prostate Specific Antigen, Screen    Colonoscopy Screening; Average Risk Patient    Screening for diabetes mellitus    Relevant Orders    TSH    Hemoglobin A1c    Vitamin B12    Vitamin D 25-Hydroxy,Total (for eval of Vitamin D levels)    Prostate Specific Antigen, Screen    Colonoscopy Screening; Average Risk Patient    Screening for thyroid disorder    Relevant Orders    TSH    Hemoglobin A1c    Vitamin B12    Vitamin D 25-Hydroxy,Total (for eval of Vitamin D levels)    Prostate Specific Antigen, Screen    Colonoscopy Screening; Average Risk Patient    Screening for  lipid disorders    Relevant Orders    TSH    Hemoglobin A1c    Vitamin B12    Vitamin D 25-Hydroxy,Total (for eval of Vitamin D levels)    Prostate Specific Antigen, Screen    Colonoscopy Screening; Average Risk Patient    Prostate cancer screening    Relevant Orders    TSH    Hemoglobin A1c    Vitamin B12    Vitamin D 25-Hydroxy,Total (for eval of Vitamin D levels)    Prostate Specific Antigen, Screen    Colonoscopy Screening; Average Risk Patient    Colon cancer screening    Relevant Orders    TSH    Hemoglobin A1c    Vitamin B12    Vitamin D 25-Hydroxy,Total (for eval of Vitamin D levels)    Prostate Specific Antigen, Screen    Colonoscopy Screening; Average Risk Patient    Acute idiopathic gout of right foot    Relevant Orders    TSH    Hemoglobin A1c    Vitamin B12    Vitamin D 25-Hydroxy,Total (for eval of Vitamin D levels)    Prostate Specific Antigen, Screen    Uric acid    XR knee right 4+ views    Colonoscopy Screening; Average Risk Patient    Chronic pain of right knee    Relevant Orders    XR knee right 4+ views    Referral to Orthopedics and Sports Medicine    Gouty arthritis of right great toe    Relevant Orders    TSH    Hemoglobin A1c    Vitamin B12    Vitamin D 25-Hydroxy,Total (for eval of Vitamin D levels)    Prostate Specific Antigen, Screen    Colonoscopy Screening; Average Risk Patient